# Patient Record
(demographics unavailable — no encounter records)

---

## 2025-01-14 NOTE — HISTORY OF PRESENT ILLNESS
[FreeTextEntry1] : Patient was last seen in 2019 for BPH and elevated PSA  Patient had CT scan done in Oct 2024, noted left kidney lesion measuring 5*5.3cm.  BPH, was on Flomax QD, self discontinued due to GI irritation, reports stable symptom without medication Elevated PSA, negative MRI in 2019, PSA 4.1in Nov 2024 New irritation/pain to meatus upon urination that can be relieved by topical cream   Please refer to URO Consult Note.

## 2025-01-14 NOTE — ADDENDUM
[FreeTextEntry1] : Entered by Ashvin Hernandes, acting as scribe for Dr. Jean Pierre Cool. The documentation recorded by the scribe accurately reflects the service I personally performed and the decisions made by me.

## 2025-01-14 NOTE — LETTER BODY
[FreeTextEntry1] : Corbin Leija MD  [address],  [place] [id number]  [phone]    Dear Dr. Leija,        Reason for Visit: BPH. Elevated PSA. Complex right renal cyst. Left renal lesion.       This is a 72 year-old gentleman with symptoms of BPH, elevated PSA, left renal lesion, and complex right renal cyst. His prostate MRI in 2019 demonstrated no suspicious prostatic lesions, PI-RADS 2. Patient is here today for evaluation. Patient was last seen 6 years ago. His CT scan in October 2024 was indeterminate. Patient has a right complex renal cyst, and a 5*5.3 cm left renal lesion. Patient reports he has weak uroflow, frequency, and hesitancy. He denies any hematuria or urinary incontinence. His symptoms are aggravated by hydration. He denies any alleviating factors. He has tried Flomax for medical therapy previously but self-discontinued after developing GI irritation. He reports no pain. All other review of systems are negative. He has no cancer in his family medical history. He has no previous surgical history. Past medical history, family history and social history were inquired and were noncontributory to current condition. The patient does not use tobacco or drink alcohol. Medications and allergies were reviewed. He has no known allergies to medication.       On examination, the patient is a healthy-appearing gentleman in no acute distress. He is alert and oriented follows commands. He has normal mood and affect. He is normocephalic. Neck is supple. Oral no thrush Respirations are unlabored. His abdomen is soft and nontender. Bladder is nonpalpable. No CVA tenderness. Neurologically he is grossly intact. No peripheral edema. Skin without gross abnormality.     His PSA is 4.1, which is elevated but stable.   Second post-void residual on bladder scan today was 60 cc. His PVR was 160 cc on the first scan.      I personally reviewed CT scan images with the patient today and images were indeterminate. A complex left renal lesion noted.       ASSESSMENT: BPH. Elevated PSA. Complex left enal lesion.       I counseled the patient on the various etiology of his symptoms. I discussed the natural history of BPH and the treatment options available. I discussed the options of conservative management with fluid in dietary restrictions, herbal therapy, medical therapy, and minimally invasive procedures. His first PVR was over 160 cc. His second PVR today was 60 cc. I recommended he try Uroxatral due to his negative side effects with Flomax. I discussed the potential side effects of the medication. I counseled the patient on its use and side effects. If the patient develops any side effects, the patient will discontinue the medication and contact me. In terms of his elevated PSA, his previous prostate MRI was negative. His PSA is stable. I reassured the patient. In terms of his complex left renal cyst, his recent CT scan was indeterminate. Patient also has a indeterminate large left renal lesion. I recommended the patient obtain a renal MRI to ensure stability. I counseled the patient regarding the procedure. The risks and benefits were discussed. Alternatives were given. I answered the patient's questions. The patient will take the necessary preparations for the procedure. The patient will follow-up as directed and will contact me with any questions or concerns. Thank you for the opportunity to participate in the care of Mr. MCCULLOUGH. I will keep you updated on his progress.       Plan: Trial of Uroxatral. Renal MRI. PVR. Follow up as directed. Paramedian Forehead Flap Text: A decision was made to reconstruct the defect utilizing an interpolation axial flap and a staged reconstruction.  A telfa template was made of the defect.  This telfa template was then used to outline the paramedian forehead pedicle flap.  The donor area for the pedicle flap was then injected with anesthesia.  The flap was excised through the skin and subcutaneous tissue down to the layer of the underlying musculature.  The pedicle flap was carefully excised within this deep plane to maintain its blood supply.  The edges of the donor site were undermined.   The donor site was closed in a primary fashion.  The pedicle was then rotated into position and sutured.  Once the tube was sutured into place, adequate blood supply was confirmed with blanching and refill.  The pedicle was then wrapped with xeroform gauze and dressed appropriately with a telfa and gauze bandage to ensure continued blood supply and protect the attached pedicle.

## 2025-01-14 NOTE — LETTER BODY
[FreeTextEntry1] : Corbin Leija MD  [address],  [place] [id number]  [phone]    Dear Dr. Leija,        Reason for Visit: BPH. Elevated PSA. Complex right renal cyst. Left renal lesion.       This is a 72 year-old gentleman with symptoms of BPH, elevated PSA, left renal lesion, and complex right renal cyst. His prostate MRI in 2019 demonstrated no suspicious prostatic lesions, PI-RADS 2. Patient is here today for evaluation. Patient was last seen 6 years ago. His CT scan in October 2024 was indeterminate. Patient has a right complex renal cyst, and a 5*5.3 cm left renal lesion. Patient reports he has weak uroflow, frequency, and hesitancy. He denies any hematuria or urinary incontinence. His symptoms are aggravated by hydration. He denies any alleviating factors. He has tried Flomax for medical therapy previously but self-discontinued after developing GI irritation. He reports no pain. All other review of systems are negative. He has no cancer in his family medical history. He has no previous surgical history. Past medical history, family history and social history were inquired and were noncontributory to current condition. The patient does not use tobacco or drink alcohol. Medications and allergies were reviewed. He has no known allergies to medication.       On examination, the patient is a healthy-appearing gentleman in no acute distress. He is alert and oriented follows commands. He has normal mood and affect. He is normocephalic. Neck is supple. Oral no thrush Respirations are unlabored. His abdomen is soft and nontender. Bladder is nonpalpable. No CVA tenderness. Neurologically he is grossly intact. No peripheral edema. Skin without gross abnormality.     His PSA is 4.1, which is elevated but stable.   Second post-void residual on bladder scan today was 60 cc. His PVR was 160 cc on the first scan.      I personally reviewed CT scan images with the patient today and images were indeterminate. A complex left renal lesion noted.       ASSESSMENT: BPH. Elevated PSA. Complex left enal lesion.       I counseled the patient on the various etiology of his symptoms. I discussed the natural history of BPH and the treatment options available. I discussed the options of conservative management with fluid in dietary restrictions, herbal therapy, medical therapy, and minimally invasive procedures. His first PVR was over 160 cc. His second PVR today was 60 cc. I recommended he try Uroxatral due to his negative side effects with Flomax. I discussed the potential side effects of the medication. I counseled the patient on its use and side effects. If the patient develops any side effects, the patient will discontinue the medication and contact me. In terms of his elevated PSA, his previous prostate MRI was negative. His PSA is stable. I reassured the patient. In terms of his complex left renal cyst, his recent CT scan was indeterminate. Patient also has a indeterminate large left renal lesion. I recommended the patient obtain a renal MRI to ensure stability. I counseled the patient regarding the procedure. The risks and benefits were discussed. Alternatives were given. I answered the patient's questions. The patient will take the necessary preparations for the procedure. The patient will follow-up as directed and will contact me with any questions or concerns. Thank you for the opportunity to participate in the care of Mr. MCCULLOUGH. I will keep you updated on his progress.       Plan: Trial of Uroxatral. Renal MRI. PVR. Follow up as directed.

## 2025-02-14 NOTE — LETTER BODY
[FreeTextEntry1] : Corbin Leija MD [address], [place] [id number] [phone]   Dear Dr. Leija,  Reason for Visit: BPH. Elevated PSA. Adrenal cystic lesion.     This is a 72 year-old gentleman with symptoms of BPH, elevated PSA, adrenal cystic lesion. His prostate MRI in 2019 demonstrated no suspicious prostatic lesions, PI-RADS 2. His CT scan in October 2024 was indeterminate. Patient was thought to have a right complex renal cyst, and a 5*5.3 cm left renal lesion. Patient is here today for follow-up. Since he was last seen, patient obtained a renal MRI which demonstrated an adrenal cystic lesion, previously thought to be a complex renal cyst. Patient reports taking Alfuzosin regularly with no side effects or difficulties. He reports stable urinary symptoms with medication. He denies any hematuria or urinary incontinence. His symptoms are aggravated by hydration. He denies any alleviating factors. He has tried Flomax for medical therapy previously but self-discontinued after developing GI irritation. He reports no pain. All other review of systems are negative. He has no cancer in his family medical history. He has no previous surgical history. Past medical history, family history and social history were inquired and were noncontributory to current condition. The patient does not use tobacco or drink alcohol. Medications and allergies were reviewed. He has no known allergies to medication.    On examination, the patient is a healthy-appearing gentleman in no acute distress. He is alert and oriented follows commands. He has normal mood and affect. He is normocephalic. Neck is supple. Oral no thrush Respirations are unlabored. His abdomen is soft and nontender. Bladder is nonpalpable. No CVA tenderness. Neurologically he is grossly intact. No peripheral edema. Skin without gross abnormality.   His PSA is 4.1, which is elevated but stable.   I personally reviewed renal MRI images with the patient today and images demonstrated an left nonvascular adrenal cystic lesion, likely hematoma.     ASSESSMENT: BPH. Elevated PSA. Adrenal cystic lesion.    I counseled the patient. In terms of his BPH, the patient reports stable symptoms on medical therapy. I recommended the patient continue taking Alfuzosin.  I renewed the patient's prescription for Alfuzosin today. I encouraged the patient to continue medications regularly as directed. In terms of his adrenal cystic lesion, his renal MRI demonstrated a 5.1 cm adrenal mass, most likely a hematoma, previously thought to be a complex renal cyst. I reassured the patient. I recommended he repeat imaging in 6 months to ensure stability. I counseled the patient regarding the procedure. The risks and benefits were discussed. Alternatives were given. I answered the patient's questions. The patient will take the necessary preparations for the procedure. The patient will follow-up as directed and will contact me with any questions or concerns. Thank you for the opportunity to participate in the care of Mr. MCCULLOUGH. I will keep you updated on his progress.    Plan: Continue Uroxatral. Repeat adrenal and pancratic  MRI imaging in 6 months. Follow up as directed.

## 2025-02-14 NOTE — LETTER BODY
[FreeTextEntry1] : Corbin Leija MD [address], [place] [id number] [phone]   Dear Dr. Leija,  Reason for Visit: BPH. Elevated PSA. Adrenal cystic lesion.     This is a 72 year-old gentleman with symptoms of BPH, elevated PSA, adrenal cystic lesion. His prostate MRI in 2019 demonstrated no suspicious prostatic lesions, PI-RADS 2. His CT scan in October 2024 was indeterminate. Patient was thought to have a right complex renal cyst, and a 5*5.3 cm left renal lesion. Patient is here today for follow-up. Since he was last seen, patient obtained a renal MRI which demonstrated an adrenal cystic lesion, previously thought to be a complex renal cyst. Patient reports taking Alfuzosin regularly with no side effects or difficulties. He reports stable urinary symptoms with medication. He denies any hematuria or urinary incontinence. His symptoms are aggravated by hydration. He denies any alleviating factors. He has tried Flomax for medical therapy previously but self-discontinued after developing GI irritation. He reports no pain. All other review of systems are negative. He has no cancer in his family medical history. He has no previous surgical history. Past medical history, family history and social history were inquired and were noncontributory to current condition. The patient does not use tobacco or drink alcohol. Medications and allergies were reviewed. He has no known allergies to medication.    On examination, the patient is a healthy-appearing gentleman in no acute distress. He is alert and oriented follows commands. He has normal mood and affect. He is normocephalic. Neck is supple. Oral no thrush Respirations are unlabored. His abdomen is soft and nontender. Bladder is nonpalpable. No CVA tenderness. Neurologically he is grossly intact. No peripheral edema. Skin without gross abnormality.   His PSA is 4.1, which is elevated but stable.   I personally reviewed renal MRI images with the patient today and images demonstrated an left nonvascular adrenal cystic lesion, likely hematoma.     ASSESSMENT: BPH. Elevated PSA. Adrenal cystic lesion.    I counseled the patient. In terms of his BPH, the patient reports stable symptoms on medical therapy. I recommended the patient continue taking Alfuzosin.  I renewed the patient's prescription for Alfuzosin today. I encouraged the patient to continue medications regularly as directed. In terms of his adrenal cystic lesion, his renal MRI demonstrated a 5.1 cm adrenal mass, most likely a hematoma, previously thought to be a complex renal cyst. I reassured the patient. I recommended he repeat imaging in 6 months to ensure stability. I counseled the patient regarding the procedure. The risks and benefits were discussed. Alternatives were given. I answered the patient's questions. The patient will take the necessary preparations for the procedure. The patient will follow-up as directed and will contact me with any questions or concerns. Thank you for the opportunity to participate in the care of Mr. MCCULLOUGH. I will keep you updated on his progress.    Plan: Continue Uroxatral. Repeat adrenal and pancratic  MRI imaging in 6 months. Follow up as directed.